# Patient Record
Sex: FEMALE | Race: BLACK OR AFRICAN AMERICAN | Employment: OTHER | URBAN - METROPOLITAN AREA
[De-identification: names, ages, dates, MRNs, and addresses within clinical notes are randomized per-mention and may not be internally consistent; named-entity substitution may affect disease eponyms.]

---

## 2017-05-31 ENCOUNTER — OFFICE VISIT (OUTPATIENT)
Dept: ORTHOPEDIC SURGERY | Facility: CLINIC | Age: 62
End: 2017-05-31

## 2017-05-31 VITALS
HEIGHT: 67 IN | WEIGHT: 200 LBS | HEART RATE: 73 BPM | SYSTOLIC BLOOD PRESSURE: 134 MMHG | BODY MASS INDEX: 31.39 KG/M2 | DIASTOLIC BLOOD PRESSURE: 78 MMHG | TEMPERATURE: 97.6 F

## 2017-05-31 DIAGNOSIS — M22.2X2 PATELLOFEMORAL PAIN SYNDROME OF LEFT KNEE: Primary | ICD-10-CM

## 2017-05-31 NOTE — PROGRESS NOTES
Patient: Antonio Goodman                MRN: [id number]       SSN: [id number]  YOB: 1955        AGE: 57 y.o. SEX: female  There is no height or weight on file to calculate BMI. PCP: Marycarmen Ramirez MD  05/31/17      Chief Complaint   Patient presents with    Knee Pain     LEFT       HISTORY OF PRESENT ILLNESS: Antonio Goodman is a 57 y.o. female who presents to the office for left knee pain. It was unassociated with any activity or event. It only occurs when she squats. She has no pain with steps, stairs or prolonged walking. She has had no injury to the knee and Voltaren gel at night eliminates her pain. Review of Systems   Constitutional: Negative. HENT: Negative. Eyes: Negative. Respiratory: Negative. Cardiovascular: Negative. Gastrointestinal: Negative. Genitourinary: Negative. Musculoskeletal: Positive for joint pain (left knee). Skin: Negative. Neurological: Negative. Endo/Heme/Allergies: Negative. Psychiatric/Behavioral: Negative. Social History     Social History    Marital status: UNKNOWN     Spouse name: N/A    Number of children: N/A    Years of education: N/A     Occupational History    Not on file. Social History Main Topics    Smoking status: Never Smoker    Smokeless tobacco: Not on file    Alcohol use No    Drug use: No    Sexual activity: Not on file     Other Topics Concern    Not on file     Social History Narrative        Past Medical History:   Diagnosis Date    Hyperlipidemia     Hypertension         No Known Allergies      Current Outpatient Prescriptions   Medication Sig    AMLODIPINE BESYLATE/BENAZEPRIL (LOTREL PO) Take 1 Tab by mouth daily. Patient takes 5-40 mg    hydrochlorothiazide (HYDRODIURIL) 25 mg tablet Take 25 mg by mouth daily.  simvastatin (ZOCOR) 20 mg tablet Take 20 mg by mouth daily.       ibuprofen (MOTRIN) 600 mg tablet Take 1 Tab by mouth every eight (8) hours as needed for Pain.    methocarbamol (ROBAXIN-750) 750 mg tablet Take 1 Tab by mouth three (3) times daily. No current facility-administered medications for this visit. Physical Exam  Constitutional: she is oriented to person, place, and time and well-developed, well-nourished, and in no distress. No distress. HENT:   Head: Normocephalic and atraumatic. Right Ear: Hearing normal.   Left Ear: Hearing normal.   Nose: Nose normal.   Eyes: Conjunctivae, EOM and lids are normal. Pupils are equal, round, and reactive to light. Neck: Trachea normal.   Pulmonary/Chest: Effort normal and breath sounds normal. No respiratory distress. Abdominal: Soft. Neurological: she is alert and oriented to person, place, and time. Skin: Skin is warm, dry and intact. she is not diaphoretic. Psychiatric: Affect normal.   Nursing note and vitals reviewed. Ortho Exam   Shows no swelling, effusion or increased warmth. Ligaments are stable and pain free when tested. Performance of a DKB shows no crepitus and no pain on today's exam.     RADIOGRAPHS:   XR's that accompanied the patient were normal in appearance. IMPRESSION & PLAN: I feel the pt's pain is patellar femoral in origin. I see no evidence of any internal derangement such as ligament tear or meniscal tear. I reccommended heat and/or ice, analgesic ointments and suggested she avoid squatting as much as possible as this increases the forces through the patellar femoral joint. I will see her back prn. Written by Jayjay Galvez, as dictated by Dr. Gerardo Amin, Dr. Darcy Gaucher, confirm that all documentation is accurate.

## 2018-03-06 ENCOUNTER — HOSPITAL ENCOUNTER (OUTPATIENT)
Dept: GENERAL RADIOLOGY | Age: 63
Discharge: HOME | End: 2018-03-06
Payer: COMMERCIAL

## 2018-03-06 DIAGNOSIS — G89.29 HEEL PAIN, CHRONIC, RIGHT: ICD-10-CM

## 2018-03-06 DIAGNOSIS — M79.671 HEEL PAIN, CHRONIC, RIGHT: ICD-10-CM

## 2018-03-06 PROCEDURE — 73650 X-RAY EXAM OF HEEL: CPT

## 2018-10-11 ENCOUNTER — HOSPITAL ENCOUNTER (OUTPATIENT)
Dept: MAMMOGRAPHY | Age: 63
Discharge: HOME | End: 2018-10-11
Attending: INTERNAL MEDICINE
Payer: COMMERCIAL

## 2018-10-11 DIAGNOSIS — Z12.31 VISIT FOR SCREENING MAMMOGRAM: ICD-10-CM

## 2018-10-11 PROCEDURE — 77063 BREAST TOMOSYNTHESIS BI: CPT

## 2018-10-16 ENCOUNTER — HOSPITAL ENCOUNTER (OUTPATIENT)
Dept: MAMMOGRAPHY | Age: 63
Discharge: HOME | End: 2018-10-16
Attending: INTERNAL MEDICINE
Payer: COMMERCIAL

## 2018-10-16 ENCOUNTER — HOSPITAL ENCOUNTER (OUTPATIENT)
Dept: ULTRASOUND IMAGING | Age: 63
Discharge: HOME | End: 2018-10-16
Attending: INTERNAL MEDICINE
Payer: COMMERCIAL

## 2018-10-16 DIAGNOSIS — R92.8 ABNORMAL MAMMOGRAM: ICD-10-CM

## 2018-10-16 PROCEDURE — 77061 BREAST TOMOSYNTHESIS UNI: CPT

## 2019-05-13 ENCOUNTER — HOSPITAL ENCOUNTER (OUTPATIENT)
Dept: ULTRASOUND IMAGING | Age: 64
Discharge: HOME | End: 2019-05-13
Attending: INTERNAL MEDICINE
Payer: COMMERCIAL

## 2019-05-13 ENCOUNTER — HOSPITAL ENCOUNTER (OUTPATIENT)
Dept: MAMMOGRAPHY | Age: 64
Discharge: HOME | End: 2019-05-13
Attending: INTERNAL MEDICINE
Payer: COMMERCIAL

## 2019-05-13 DIAGNOSIS — N64.89 DISTORTION OF CONTOUR OF BREAST: ICD-10-CM

## 2019-05-13 DIAGNOSIS — R92.8 ABNORMAL MAMMOGRAM: ICD-10-CM

## 2019-05-13 PROCEDURE — 77061 BREAST TOMOSYNTHESIS UNI: CPT

## 2019-11-14 ENCOUNTER — HOSPITAL ENCOUNTER (OUTPATIENT)
Dept: MAMMOGRAPHY | Age: 64
Discharge: HOME | End: 2019-11-14
Attending: INTERNAL MEDICINE
Payer: COMMERCIAL

## 2019-11-14 ENCOUNTER — HOSPITAL ENCOUNTER (OUTPATIENT)
Dept: ULTRASOUND IMAGING | Age: 64
Discharge: HOME | End: 2019-11-14
Attending: INTERNAL MEDICINE
Payer: COMMERCIAL

## 2019-11-14 DIAGNOSIS — R92.8 ABNORMAL MAMMOGRAM: ICD-10-CM

## 2019-11-14 DIAGNOSIS — N63.10 MASS OF BREAST, RIGHT: ICD-10-CM

## 2019-11-14 PROCEDURE — 77062 BREAST TOMOSYNTHESIS BI: CPT

## 2019-12-02 ENCOUNTER — HOSPITAL ENCOUNTER (OUTPATIENT)
Dept: LAB | Age: 64
Discharge: HOME | End: 2019-12-02
Payer: COMMERCIAL

## 2019-12-02 PROCEDURE — 36415 COLL VENOUS BLD VENIPUNCTURE: CPT

## 2019-12-02 PROCEDURE — 86038 ANTINUCLEAR ANTIBODIES: CPT

## 2019-12-02 PROCEDURE — 86225 DNA ANTIBODY NATIVE: CPT

## 2019-12-03 LAB
ANA SER QL: NEGATIVE
DSDNA AB SER-ACNC: 1 IU/ML (ref 0–9)

## 2019-12-11 ENCOUNTER — HOSPITAL ENCOUNTER (OUTPATIENT)
Dept: PHYSICAL THERAPY | Age: 64
Discharge: HOME | End: 2019-12-11
Payer: COMMERCIAL

## 2019-12-11 PROCEDURE — 97161 PT EVAL LOW COMPLEX 20 MIN: CPT

## 2019-12-11 PROCEDURE — 97110 THERAPEUTIC EXERCISES: CPT

## 2019-12-11 NOTE — PROGRESS NOTES
PT DAILY TREATMENT NOTE/CERVICAL [id number]    Patient Name: Nithin Jamil  Date:2019  : 1955  [x]  Patient  Verified  Payor: BLUE CROSS / Plan: [employer] [facility] / Product Type: PPO /    In time: 10:05  Out time: 10:50  Total Treatment Time (min): 45  Visit #: 1 of 8    Medicare/BCBS Only   Total Timed Codes (min):  8 1:1 Treatment Time:  45     Treatment Area: Radiculopathy, cervical region [M54.12]    SUBJECTIVE  Pain Level (0-10 scale): 5/10  []constant []intermittent []improving []worsening []no change since onset    Any medication changes, allergies to medications, adverse drug reactions, diagnosis change, or new procedure performed?: [x] No    [] Yes (see summary sheet for update)  Subjective functional status/changes:       Mechanism of Injury: pt. Reports her neck isn't too bad but is having a lot of numbness/burning in B legs. Reports also having numbness in anal area. Feels electrical impulses. Denies incontinence. Symptoms began in October with legs. Neck pain began in November. Denies numbness/tingling down arm. Numbness is constant. Pain is ok with rest. Pain worsens with activity. Was walking 4 miles a day but can't keep up now. Reports going to gym regularly. Worse symptoms on left. PMHx/Surgical Hx: lumbar laminectomy in   Work Hx: retired, . Difficulty sitting at piano  Living Situation: lives with oldest son  Pt Goals:   To return to normal    OBJECTIVE/EXAMINATION    8 min Therapeutic Exercise:  [] See flow sheet :   Rationale: increase ROM and increase strength to improve the patients ability to increase ease of ADLs          With   [x] TE   [] TA   [] neuro   [] other: Patient Education: [x] Review HEP    [] Progressed/Changed HEP based on:   [] positioning   [] body mechanics   [] transfers   [] heat/ice application    [] other:      Physical Therapy Evaluation Cervical Spine     Active Movements: [] N/A   [] Too acute   [] Other:  ROM % AROM % PROM Comments:pain, area   Forward flexion 80     Extension 30     SB right 25     SB left  25     Rotation right 60     Rotation left 60       Lumbar AROM flex;: 75% ext: 75% SB B: 75% rotation B: 75%  No change in symptoms with repeated flexion or extension       Neuro Screen (myotome/dematome/felexes): [] WNL  Myotome Level Muscle Test Myotome Level Muscle Test   C5 Shoulder Adduction - Deltoid  B: 4/5 C8 Finger Flexors B: 4+/5   C6 Wrist Extension  B: 4+/5 T1 Finger Abduction - Interossei  B: 4/5   C7 Elbow Extension  B: 4+/5     Comments:    Hip flex: B: 4/5 hip abd: B: 4-/5 hip ext: B: 4-/5 knee ext: B: 4+/5 knee flex: B: 4+/5 DF: B: 4+/5 PF B: 4+/5 great toe extension: B: 4+/5.      Special Tests:  Cervical:        Vertebral Artery:  [] R    [] L    [] +    [] -       Alar Ligament: [] R    [] L    [] +    [] -       Transverse Lig: [] R    [] L    [] +    [] -       Spurling's:  [x] R    [x] L    [] +    [x] -       Distraction:  [x] R    [x] L    [] +    [x] -       Compression: [x] R    [x] L    [] +    [x] -    Other tests/comments:  Negative SLR test  Negative prone instability test  B patella reflex: 1+ B achilles reflex: 0  Negative Babinski test       Pain Level (0-10 scale) post treatment:  5/10    ASSESSMENT/Changes in Function:      [x]  See Plan of Care  []  See progress note/recertification  []  See Discharge Summary         Progress towards goals / Updated goals:  See POC    PLAN  []  Upgrade activities as tolerated     [x]  Continue plan of care  []  Update interventions per flow sheet       []  Discharge due to:_  []  Other:_      Lucius Lanes, PT 12/11/2019  10:12 AM

## 2019-12-11 NOTE — PROGRESS NOTES
[facility]  [facility] [employer] [facility]  [address] [place] [address]  [phone] fax    Plan of Care/ Statement of Necessity for Physical Therapy Services    Patient name: Ashley Lew Start of Care: 2019   Referral source: Alex Sheehan MD : 1955    Medical Diagnosis: Radiculopathy, cervical region [M54.12]  Payor: BLUE CROSS / [facility] / Product Type: PPO /  Onset Date: 2019    Treatment Diagnosis:  Low back pain   Prior Hospitalization: see medical history Provider#: [id number]   Medications: Verified on Patient summary List    Comorbidities: HTN   Prior Level of Function: Ind with ambulation, Ind with ADLs      The Plan of Care and following information is based on the information from the initial evaluation. Assessment/ key information:  Pt. Is a 71year old female c/o B LE numbness and tingling that began with insidious onset in October. She does report numbness also in saddle region, but denies incontinence. Pain is worse with activity and decreases with rest. She presents with mild decrease in lumbar mobility with no change in radicular symptoms with repeated flexion or extension. No myotome involvement was noted but she has decreased B hip strength. Negative SLR test. She has decreased sensation to light touch in B LE but can still feel light touch throughout. Negative Babinski test. Skilled PT is medically necessary in order to improve LE strength and core stability for increased ease of ADLs and improved quality of life.      Evaluation Complexity History MEDIUM  Complexity : 1-2 comorbidities / personal factors will impact the outcome/ POC ; Examination MEDIUM Complexity : 3 Standardized tests and measures addressing body structure, function, activity limitation and / or participation in recreation  ;Presentation LOW Complexity : Stable, uncomplicated  ;Clinical Decision Making MEDIUM Complexity : FOTO score of 26-74  Overall Complexity Rating: LOW   Problem List: pain affecting function, decrease ROM, decrease strength, impaired gait/ balance, decrease ADL/ functional abilitiies, decrease activity tolerance, decrease flexibility/ joint mobility and decrease transfer abilities   Treatment Plan may include any combination of the following: Therapeutic exercise, Therapeutic activities, Neuromuscular re-education, Physical agent/modality, Gait/balance training, Manual therapy, Patient education, Self Care training, Functional mobility training and Home safety training  Patient / Family readiness to learn indicated by: asking questions  Persons(s) to be included in education: patient (P)  Barriers to Learning/Limitations: None  Patient Goal (s): to have less pain  Patient Self Reported Health Status: fair  Rehabilitation Potential: fair    Short Term Goals: To be accomplished in 1 weeks:  1. Patient will demonstrate compliance with HEP in order to improve LE strength     Long Term Goals: To be accomplished in 4 weeks:  1. Patient will improve FOTO score by 10 points in order to demonstrate a significant improvement in function. 2. Patient will improve B hip strength to 4/5 in order to increase ease of ADLs. 3. Patient will report a 50% improvement in symptoms since [facility] in order to improve quality of life. Frequency / Duration: Patient to be seen 2 times per week for 4 weeks. Patient/ Caregiver education and instruction: Diagnosis, prognosis, exercises   [x]  Plan of care has been reviewed with PTA    Certification Period: 12/11/19-1/9/20  Odette Garcia, JESSIKA 12/11/2019 6:03 PM    ________________________________________________________________________    I certify that the above Therapy Services are being furnished while the patient is under my care. I agree with the treatment plan and certify that this therapy is necessary.     Physician's Signature:____________Date:_________TIME:________    Carole Waller, Date and Time must be completed for valid certification **    Please sign and return to [facility]  [facility] [employer] [facility]  [address] [place] [address]  [phone] fax

## 2019-12-13 ENCOUNTER — HOSPITAL ENCOUNTER (OUTPATIENT)
Dept: PHYSICAL THERAPY | Age: 64
Discharge: HOME | End: 2019-12-13
Payer: COMMERCIAL

## 2019-12-13 PROCEDURE — 97110 THERAPEUTIC EXERCISES: CPT

## 2019-12-13 NOTE — PROGRESS NOTES
PT DAILY TREATMENT NOTE 10-18    Patient Name: Ramona Beltran  Date:2019  : 1955  [x]  Patient  Verified  Payor: BLUE CROSS / Plan: [facility] / Product Type: PPO /    In time:905  Out time:953  Total Treatment Time (min): 48  Visit #: 2 of 8    Medicare/BCBS Only   Total Timed Codes (min):  48 1:1 Treatment Time:  38       Treatment Area: Low back pain [M54.5]    SUBJECTIVE  Pain Level (0-10 scale): 5/10  Any medication changes, allergies to medications, adverse drug reactions, diagnosis change, or new procedure performed?: [x] No    [] Yes (see summary sheet for update)  Subjective functional status/changes:   [] No changes reported   LS laminectomy then 4 yrs ago sx came on and sx magnifies as the day goes on. Continues to have numbness and burning into legs with saddle numbness.      OBJECTIVE    Modality rationale: decrease pain to improve the patients ability to ease with   Min Type Additional Details    [] Estim:  []Unatt       []IFC  []Premod                        []Other:  []w/ice   []w/heat  Position:  Location:    [] Estim: []Att    []TENS instruct  []NMES                    []Other:  []w/US   []w/ice   []w/heat  Position:  Location:    []  Traction: [] Cervical       []Lumbar                       [] Prone          []Supine                       []Intermittent   []Continuous Lbs:  [] before manual  [] after manual    []  Ultrasound: []Continuous   [] Pulsed                           []1MHz   []3MHz W/cm2:  Location:    []  Iontophoresis with dexamethasone         Location: [] Take home patch   [] In clinic   10 []  Ice     [x]  heat  []  Ice massage  []  Laser   []  Anodyne Position:seated  Location: LS    []  Laser with stim  []  Other:  Position:  Location:    []  Vasopneumatic Device Pressure:       [] lo [] med [] hi   Temperature: [] lo [] med [] hi   [] Skin assessment post-treatment:  []intact []redness- no adverse reaction      38 min Therapeutic Exercise:  [] See flow sheet :   Rationale: increase ROM, increase strength and improve coordination to improve the patients ability to ease with ADL's. With   [] TE   [] TA   [] neuro   [] other: Patient Education: [x] Review HEP    [] Progressed/Changed HEP based on:   [] positioning   [] body mechanics   [] transfers   [] heat/ice application    [] other:      Other Objective/Functional Measures: Good tolerance to ex's. Pt did not report increased pain, but no decrease in pain either. She required cueing for all ex's      Pain Level (0-10 scale) post treatment: 4-5/10    ASSESSMENT/Changes in Function: Progressing slowly. No change in sx. Pt educated on TA holds during the day and during activities and ex's. She explained that she has the urge to have a bowel movement after a bowel movement. Patient will continue to benefit from skilled PT services to modify and progress therapeutic interventions, address functional mobility deficits, address ROM deficits, address strength deficits, analyze and address soft tissue restrictions, analyze and cue movement patterns, analyze and modify body mechanics/ergonomics and assess and modify postural abnormalities to attain remaining goals. [x]  See Plan of Care  []  See progress note/recertification  []  See Discharge Summary         Progress towards goals / Updated goals:  1. Patient will demonstrate compliance with HEP in order to improve LE strength    Progressing. 12/13/19  Long Term Goals: To be accomplished in 4 weeks:  1. Patient will improve FOTO score by 10 points in order to demonstrate a significant improvement in function. 2. Patient will improve B hip strength to 4/5 in order to increase ease of ADLs. 3. Patient will report a 50% improvement in symptoms since [facility] in order to improve quality of life.      PLAN  [x]  Upgrade activities as tolerated     [x]  Continue plan of care  []  Update interventions per flow sheet       []  Discharge due to:_  [] Other:_      Prisca Cartwright, PT 12/13/2019  9:09 AM    Future Appointments   Date Time Provider Lai García   12/17/2019  9:00 AM [facility]

## 2019-12-17 ENCOUNTER — HOSPITAL ENCOUNTER (OUTPATIENT)
Dept: PHYSICAL THERAPY | Age: 64
Discharge: HOME | End: 2019-12-17
Payer: COMMERCIAL

## 2019-12-17 PROCEDURE — 97110 THERAPEUTIC EXERCISES: CPT

## 2019-12-17 NOTE — PROGRESS NOTES
PT DAILY TREATMENT NOTE 10-18    Patient Name: Handy Magana  Date:2019  : 1955  [x]  Patient  Verified  Payor: BLUE CROSS / Plan: [employer] [facility] / Product Type: PPO /    In time:908  Out time:950  Total Treatment Time (min): 32  Visit #: 4 of 8    Medicare/BCBS Only   Total Timed Codes (min):  32 1:1 Treatment Time:  32       Treatment Area: Low back pain [M54.5]    SUBJECTIVE  Pain Level (0-10 scale): 5/10  Any medication changes, allergies to medications, adverse drug reactions, diagnosis change, or new procedure performed?: [x] No    [] Yes (see summary sheet for update)  Subjective functional status/changes:   [] No changes reported  Feels like rubber bands on her legs all the time. Pain stays at 5/10 constantly and depending on level of activity, she will have increased pain.     OBJECTIVE    Modality rationale: decrease edema and decrease pain to improve the patients ability to ease with ADL;s   Min Type Additional Details    [] Estim:  []Unatt       []IFC  []Premod                        []Other:  []w/ice   []w/heat  Position:  Location:    [] Estim: []Att    []TENS instruct  []NMES                    []Other:  []w/US   []w/ice   []w/heat  Position:  Location:    []  Traction: [] Cervical       []Lumbar                       [] Prone          []Supine                       []Intermittent   []Continuous Lbs:  [] before manual  [] after manual    []  Ultrasound: []Continuous   [] Pulsed                           []1MHz   []3MHz W/cm2:  Location:    []  Iontophoresis with dexamethasone         Location: [] Take home patch   [] In clinic   10 []  Ice     [x]  heat  []  Ice massage  []  Laser   []  Anodyne Position: seated  Location:LS    []  Laser with stim  []  Other:  Position:  Location:    []  Vasopneumatic Device Pressure:       [] lo [] med [] hi   Temperature: [] lo [] med [] hi   [] Skin assessment post-treatment:  []intact []redness- no adverse reaction    []redness  adverse reaction:       32 min Therapeutic Exercise:  [] See flow sheet :   Rationale: increase ROM and increase strength to improve the patients ability to ease with ADL's. With   [] TE   [] TA   [] neuro   [] other: Patient Education: [x] Review HEP    [] Progressed/Changed HEP based on:   [] positioning   [] body mechanics   [] transfers   [] heat/ice application    [] other:      Other Objective/Functional Measures: Good tolerance to ex's. No increased pain. Gradually adding /progressing ex's   Work on extension with left LE weakness ans challenged with extension lift. No change in radicular sx in prone laying or DAVID. Pain Level (0-10 scale) post treatment:    ASSESSMENT/Changes in Function: Feels like a weakness that is intermittent in to her legs that makes them want to give out. Burning in left LE >right LE in general.    Patient will continue to benefit from skilled PT services to modify and progress therapeutic interventions, address functional mobility deficits, address ROM deficits, address strength deficits, analyze and address soft tissue restrictions, analyze and cue movement patterns, analyze and modify body mechanics/ergonomics and assess and modify postural abnormalities to attain remaining goals. [x]  See Plan of Care  []  See progress note/recertification  []  See Discharge Summary         Progress towards goals / Updated goals:  1. Patient will demonstrate compliance with HEP in order to improve LE strength    Progressing. 12/13/19  Long Term Goals: To be accomplished in 4 weeks:  1. Patient will improve FOTO score by 10 points in order to demonstrate a significant improvement in function. 2. Patient will improve B hip strength to 4/5 in order to increase ease of ADLs.    3. Patient will report a 50% improvement in symptoms since [facility] in order to improve quality of life.     PLAN  [x]  Upgrade activities as tolerated     [x]  Continue plan of care  []  Update interventions per flow sheet       []  Discharge due to:_  []  Other:_      Bridget Spencer, PT 12/17/2019  9:22 AM    Future Appointments   Date Time Provider Lai García   12/19/2019 10:00 AM Sundanirudh Bird, PTA [facility]   12/23/2019  5:00 PM Yohan Miranda, PT [facility]   12/27/2019  5:30 PM Yohan Miranda, PT [id number]   1/2/2020  5:30 PM Yohan Miranda, PT [facility]   1/3/2020  9:00 AM Yohan Miranda, PT [facility]

## 2019-12-19 ENCOUNTER — HOSPITAL ENCOUNTER (OUTPATIENT)
Dept: PHYSICAL THERAPY | Age: 64
Discharge: HOME | End: 2019-12-19
Payer: COMMERCIAL

## 2019-12-19 PROCEDURE — 97110 THERAPEUTIC EXERCISES: CPT

## 2019-12-19 NOTE — PROGRESS NOTES
PT DAILY TREATMENT NOTE 10-18    Patient Name: Nick Sheikh  Date:2019  : 1955  [x]  Patient  Verified  Payor: BLUE CROSS / Plan: [facility] [place] / Product Type: PPO /    In time:1002  Out time:1042  Total Treatment Time (min): 40  Visit #: 4 of 8    Medicare/BCBS Only   Total Timed Codes (min):  30 1:1 Treatment Time:  30       Treatment Area: Low back pain [M54.5]    SUBJECTIVE  Pain Level (0-10 scale): 5  Any medication changes, allergies to medications, adverse drug reactions, diagnosis change, or new procedure performed?: [x] No    [] Yes (see summary sheet for update)  Subjective functional status/changes:   [] No changes reported  Patient reported constant numbness but slight pain in legs this morning     OBJECTIVE    Modality rationale: decrease pain and increase tissue extensibility to improve the patients ability to perform ADLs   Min Type Additional Details    [] Estim:  []Unatt       []IFC  []Premod                        []Other:  []w/ice   []w/heat  Position:  Location:    [] Estim: []Att    []TENS instruct  []NMES                    []Other:  []w/US   []w/ice   []w/heat  Position:  Location:    []  Traction: [] Cervical       []Lumbar                       [] Prone          []Supine                       []Intermittent   []Continuous Lbs:  [] before manual  [] after manual    []  Ultrasound: []Continuous   [] Pulsed                           []1MHz   []3MHz W/cm2:  Location:    []  Iontophoresis with dexamethasone         Location: [] Take home patch   [] In clinic   10 []  Ice     [x]  heat  []  Ice massage  []  Laser   []  Anodyne Position:seated  Location:back    []  Laser with stim  []  Other:  Position:  Location:    []  Vasopneumatic Device Pressure:       [] lo [] med [] hi   Temperature: [] lo [] med [] hi   [] Skin assessment post-treatment:  []intact []redness- no adverse reaction    []redness  adverse reaction:         30 min Therapeutic Exercise:  [x] See flow sheet :   Rationale: increase ROM and increase strength to improve the patients ability to perform ADLs                With   [] TE   [] TA   [] neuro   [] other: Patient Education: [x] Review HEP    [] Progressed/Changed HEP based on:   [] positioning   [] body mechanics   [] transfers   [] heat/ice application    [] other:      Other Objective/Functional Measures: progressed therex per flow sheet     Pain Level (0-10 scale) post treatment: 0    ASSESSMENT/Changes in Function: patient tolerated progression withoutl c/o increased pain. Patient will continue to benefit from skilled PT services to modify and progress therapeutic interventions, address functional mobility deficits, address ROM deficits, address strength deficits, analyze and address soft tissue restrictions and analyze and cue movement patterns to attain remaining goals. [x]  See Plan of Care  []  See progress note/recertification  []  See Discharge Summary         Progress towards goals / Updated goals:  1. Patient will demonstrate compliance with HEP in order to improve LE strength    Progressing. 12/13/19  Long Term Goals: To be accomplished in 4 weeks:  1. Patient will improve FOTO score by 10 points in order to demonstrate a significant improvement in function. 2. Patient will improve B hip strength to 4/5 in order to increase ease of ADLs.    3. Patient will report a 50% improvement in symptoms since [facility] in order to improve quality of life.        PLAN  []  Upgrade activities as tolerated     [x]  Continue plan of care  []  Update interventions per flow sheet       []  Discharge due to:_  []  Other:_      Kalyani Mann, PTA 12/19/2019  10:13 AM    Future Appointments   Date Time Provider Lai García   12/23/2019  5:00 PM Kalyn Northern, PT [facility]   12/27/2019  5:30 PM [facility], PT [facility]   1/2/2020  5:30 PM Kalyn Northern PT [facility]   1/3/2020  9:00 AM Kalyn Northern, PT [facility]

## 2019-12-23 ENCOUNTER — HOSPITAL ENCOUNTER (OUTPATIENT)
Dept: PHYSICAL THERAPY | Age: 64
Discharge: HOME | End: 2019-12-23
Payer: COMMERCIAL

## 2019-12-23 PROCEDURE — 97110 THERAPEUTIC EXERCISES: CPT

## 2019-12-23 NOTE — PROGRESS NOTES
PT DAILY TREATMENT NOTE 10-18    Patient Name: Handy Magana  Date:2019  : 1955  [x]  Patient  Verified  Payor: BLUE CROSS / Plan: [employer] [facility] / Product Type: PPO /    In time: 5:00  Out time: 5:44  Total Treatment Time (min): 44  Visit #: 5 of 8    Medicare/BCBS Only   Total Timed Codes (min):  34 1:1 Treatment Time:  34       Treatment Area: Low back pain [M54.5]    SUBJECTIVE  Pain Level (0-10 scale): 5/10  Any medication changes, allergies to medications, adverse drug reactions, diagnosis change, or new procedure performed?: [x] No    [] Yes (see summary sheet for update)  Subjective functional status/changes:   [] No changes reported  Pt. Reports she is feeling about the bear today.      OBJECTIVE    Modality rationale: decrease pain and increase tissue extensibility to improve the patients ability to increase ease of ADls   Min Type Additional Details    [] Estim:  []Unatt       []IFC  []Premod                        []Other:  []w/ice   []w/heat  Position:  Location:    [] Estim: []Att    []TENS instruct  []NMES                    []Other:  []w/US   []w/ice   []w/heat  Position:  Location:    []  Traction: [] Cervical       []Lumbar                       [] Prone          []Supine                       []Intermittent   []Continuous Lbs:  [] before manual  [] after manual    []  Ultrasound: []Continuous   [] Pulsed                           []1MHz   []3MHz W/cm2:  Location:    []  Iontophoresis with dexamethasone         Location: [] Take home patch   [] In clinic   10 []  Ice     [x]  heat  []  Ice massage  []  Laser   []  Anodyne Position: seated  Location: low back    []  Laser with stim  []  Other:  Position:  Location:    []  Vasopneumatic Device Pressure:       [] lo [] med [] hi   Temperature: [] lo [] med [] hi   [x] Skin assessment post-treatment:  [x]intact []redness- no adverse reaction    []redness  adverse reaction:     34 min Therapeutic Exercise:  [x] See flow sheet :   Rationale: increase ROM and increase strength to improve the patients ability to increase ease of ADLs          With   [x] TE   [] TA   [] neuro   [] other: Patient Education: [x] Review HEP    [] Progressed/Changed HEP based on:   [] positioning   [] body mechanics   [] transfers   [] heat/ice application    [] other:      Other Objective/Functional Measures:   % improvement in symptoms: 0%  Prone extension increased radicular symptoms so these were D/C  No change in symptoms with SB DKTC  Pt. Was educated on table slides for HEP    Pain Level (0-10 scale) post treatment:  5/10    ASSESSMENT/Changes in Function:  Pt. Is making limited progress towards goals. She continues to have significant B radicular symptoms that have not changed with PT. Symptoms appear to worsen with extension exercises but no movements have centralized her symptoms so far. Patient will continue to benefit from skilled PT services to modify and progress therapeutic interventions, address functional mobility deficits, address ROM deficits, address strength deficits, analyze and address soft tissue restrictions, analyze and cue movement patterns, analyze and modify body mechanics/ergonomics and assess and modify postural abnormalities to attain remaining goals. Progress towards goals / Updated goals:  1. Patient will demonstrate compliance with HEP in order to improve LE strength    Progressing. 12/13/19  Long Term Goals: To be accomplished in 4 weeks:  1. Patient will improve FOTO score by 10 points in order to demonstrate a significant improvement in function. 2. Patient will improve B hip strength to 4/5 in order to increase ease of ADLs.    3. Patient will report a 50% improvement in symptoms since [facility] in order to improve quality of life.   Not met: 0% improvement (12/23/19)    PLAN  []  Upgrade activities as tolerated     [x]  Continue plan of care  []  Update interventions per flow sheet       []  Discharge due to:_  [] Other:_      Stef Durham, PT 12/23/2019  5:02 PM    Future Appointments   Date Time Provider Lai Alanizi   12/27/2019  5:30 PM Deena Love, PT [facility]   1/2/2020  5:30 PM Sharan Benavidez PTA [facility]   1/3/2020  9:00 AM Deena Love, PT [facility]

## 2019-12-27 ENCOUNTER — HOSPITAL ENCOUNTER (OUTPATIENT)
Dept: PHYSICAL THERAPY | Age: 64
Discharge: HOME | End: 2019-12-27
Payer: COMMERCIAL

## 2019-12-27 PROCEDURE — 97110 THERAPEUTIC EXERCISES: CPT

## 2019-12-27 PROCEDURE — 97012 MECHANICAL TRACTION THERAPY: CPT

## 2019-12-27 NOTE — PROGRESS NOTES
PT DAILY TREATMENT NOTE 10-18    Patient Name: Jai Hickey  Date:2019  : 1955  [x]  Patient  Verified  Payor: BLUE CROSS / Plan: [employer] [facility] / Product Type: PPO /    In time: 5:26  Out time: 6:10  Total Treatment Time (min): 44  Visit #: 6 of 8    Medicare/BCBS Only   Total Timed Codes (min):  34 1:1 Treatment Time:  34       Treatment Area: Low back pain [M54.5]    SUBJECTIVE  Pain Level (0-10 scale):  5/10  Any medication changes, allergies to medications, adverse drug reactions, diagnosis change, or new procedure performed?: [x] No    [] Yes (see summary sheet for update)  Subjective functional status/changes:   [] No changes reported  Pt. Reports she is still feeling about the same.      OBJECTIVE    Modality rationale: decrease pain and increase tissue extensibility to improve the patients ability to increase ease of ADLs   Min Type Additional Details    [] Estim:  []Unatt       []IFC  []Premod                        []Other:  []w/ice   []w/heat  Position:  Location:    [] Estim: []Att    []TENS instruct  []NMES                    []Other:  []w/US   []w/ice   []w/heat  Position:  Location:   10 [x]  Traction: [] Cervical       [x]Lumbar                       [] Prone          [x]Supine                       [x]Intermittent   []Continuous Lbs: 75/50#  [] before manual  [] after manual    []  Ultrasound: []Continuous   [] Pulsed                           []1MHz   []3MHz W/cm2:  Location:    []  Iontophoresis with dexamethasone         Location: [] Take home patch   [] In clinic    []  Ice     []  heat  []  Ice massage  []  Laser   []  Anodyne Position:  Location:    []  Laser with stim  []  Other:  Position:  Location:    []  Vasopneumatic Device Pressure:       [] lo [] med [] hi   Temperature: [] lo [] med [] hi   [x] Skin assessment post-treatment:  [x]intact []redness- no adverse reaction    []redness  adverse reaction:     34 min Therapeutic Exercise:  [x] See flow sheet : Rationale: increase ROM and increase strength to improve the patients ability to increase ease of ADLs          With   [] TE   [] TA   [] neuro   [] other: Patient Education: [x] Review HEP    [] Progressed/Changed HEP based on:   [] positioning   [] body mechanics   [] transfers   [] heat/ice application    [] other:      Other Objective/Functional Measures:   No change in radicular symptoms with therex   B hip abduction MMT: 4-/5  No change with traction or therex today  Discussed lack of progress with pt. And she is agreeable to D/C at this time and to follow back up with physician     Pain Level (0-10 scale) post treatment: 5/10    ASSESSMENT/Changes in Function:      See D/C note     Progress towards goals / Updated goals:  See d/C note    PLAN  []  Upgrade activities as tolerated     []  Continue plan of care  []  Update interventions per flow sheet       [x]  Discharge due to:_ lack of progress towards goals.    []  Other:_      Cher Khan PT 12/27/2019  9:10 AM    Future Appointments   Date Time Provider Lai García   12/27/2019  5:30 PM Ethan Cabrera, PT [facility]   1/2/2020  5:30 PM Amy George PTA [facility]   1/3/2020  9:00 AM Ethan Cabrera PT [facility]

## 2019-12-27 NOTE — PROGRESS NOTES
[facility] [address]  [phone] fax    Physical Therapy Discharge Summary    Patient name: Adrienne Reed Start of Care:  19   Referral source: Reyes Mckeon MD : 1955   Medical/Treatment Diagnosis: Low back pain [M54.5]  Payor: Any Mcnally / [facility] [address] [facility] [address] / Product Type: PPO /  Onset Date: 2019     Prior Hospitalization: see medical history Provider#: [id number]   Medications: Verified on Patient Summary List    Comorbidities: HTN   Prior Level of Function: Ind with ambulation, Ind with ADLs    Visits from Start of Care:  6    Missed Visits: 0    Reporting Period : 19 to 19    Summary of Care:  Goal: Patient will improve FOTO score by 10 points in order to demonstrate a significant improvement in function. Status at last note/certification: n/a  Status at discharge: not met    Goal: Patient will improve B hip strength to 4/5 in order to increase ease of ADLs. Status at last note/certification: hip abd: B: 4-/5  Status at discharge: not met    Goal: Patient will report a 50% improvement in symptoms since [facility] in order to improve quality of life. Status at last note/certification: n/a  Status at discharge: not met    Pt. Was seen for 6 visits and did not make any progress so will be D/C at this time. No change in radicular symptoms with repeated flexion exercises or with traction. She has increased radicular symptoms with extension activities. She continues to have decreased B hip strength at 4-/5. Pt. Continues to have numbness in saddle region that remains unchanged. She was educated on following back up with physician secondary to lack of progress with PT and to continue with her HEP.      ASSESSMENT/RECOMMENDATIONS:  [x]Discontinue therapy: []Patient has reached or is progressing toward set goals      []Patient is non-compliant or has abdicated      [x]Due to lack of appreciable progress towards set goals    Mary Kate Connolly, JESSIKA 12/27/2019 6:53 PM

## 2020-01-02 ENCOUNTER — APPOINTMENT (OUTPATIENT)
Dept: PHYSICAL THERAPY | Age: 65
End: 2020-01-02

## 2020-01-03 ENCOUNTER — APPOINTMENT (OUTPATIENT)
Dept: PHYSICAL THERAPY | Age: 65
End: 2020-01-03

## 2020-11-16 ENCOUNTER — HOSPITAL ENCOUNTER (OUTPATIENT)
Dept: BONE DENSITY | Age: 65
Discharge: HOME | End: 2020-11-16
Attending: OBSTETRICS & GYNECOLOGY
Payer: COMMERCIAL

## 2020-11-16 ENCOUNTER — HOSPITAL ENCOUNTER (OUTPATIENT)
Dept: MAMMOGRAPHY | Age: 65
Discharge: HOME | End: 2020-11-16
Attending: INTERNAL MEDICINE
Payer: COMMERCIAL

## 2020-11-16 DIAGNOSIS — Z13.820 SPECIAL SCREENING FOR OSTEOPOROSIS: ICD-10-CM

## 2020-11-16 DIAGNOSIS — Z12.31 VISIT FOR SCREENING MAMMOGRAM: ICD-10-CM

## 2020-11-16 PROCEDURE — 77080 DXA BONE DENSITY AXIAL: CPT

## 2020-11-16 PROCEDURE — 77063 BREAST TOMOSYNTHESIS BI: CPT

## 2021-04-08 ENCOUNTER — OFFICE VISIT (OUTPATIENT)
Dept: ORTHOPEDIC SURGERY | Age: 66
End: 2021-04-08
Payer: COMMERCIAL

## 2021-04-08 VITALS
WEIGHT: 217 LBS | OXYGEN SATURATION: 98 % | RESPIRATION RATE: 16 BRPM | HEART RATE: 78 BPM | HEIGHT: 67 IN | BODY MASS INDEX: 34.06 KG/M2 | TEMPERATURE: 97.3 F

## 2021-04-08 DIAGNOSIS — M22.42 CHONDROMALACIA OF BOTH PATELLAE: Primary | ICD-10-CM

## 2021-04-08 DIAGNOSIS — M22.41 CHONDROMALACIA OF BOTH PATELLAE: Primary | ICD-10-CM

## 2021-04-08 PROCEDURE — 99203 OFFICE O/P NEW LOW 30 MIN: CPT | Performed by: ORTHOPAEDIC SURGERY

## 2021-04-08 RX ORDER — OLMESARTAN MEDOXOMIL, AMLODIPINE AND HYDROCHLOROTHIAZIDE TABLET 40/10/25 MG 40; 10; 25 MG/1; MG/1; MG/1
TABLET ORAL
COMMUNITY
Start: 2021-01-28

## 2021-04-08 RX ORDER — SPIRONOLACTONE 25 MG/1
TABLET ORAL
COMMUNITY
Start: 2021-03-30

## 2021-04-08 NOTE — PATIENT INSTRUCTIONS
Patellofemoral Pain Syndrome (Runner's Knee): Exercises Introduction Here are some examples of exercises for you to try. The exercises may be suggested for a condition or for rehabilitation. Start each exercise slowly. Ease off the exercises if you start to have pain. You will be told when to start these exercises and which ones will work best for you. How to do the exercises Calf wall stretch 1. Stand facing a wall with your hands on the wall at about eye level. Put your affected leg about a step behind your other leg. 2. Keeping your back leg straight and your back heel on the floor, bend your front knee and gently bring your hip and chest toward the wall until you feel a stretch in the calf of your back leg. 3. Hold the stretch for at least 15 to 30 seconds. 4. Repeat 2 to 4 times. 5. Repeat steps 1 through 4, but this time keep your back knee bent. Quadriceps stretch 1. If you are not steady on your feet, hold on to a chair, counter, or wall. 2. Bend your affected leg, and reach behind you to grab the front of your foot or ankle with the hand on the same side. For example, if you are stretching your right leg, use your right hand. 3. Keeping your knees next to each other, pull your foot toward your buttock until you feel a gentle stretch across the front of your hip and down the front of your thigh. Your knee should be pointed directly to the ground, and not out to the side. 4. Hold the stretch for at least 15 to 30 seconds. 5. Repeat 2 to 4 times. Hamstring wall stretch 1. Lie on your back in a doorway, with your good leg through the open door. 2. Slide your affected leg up the wall to straighten your knee. You should feel a gentle stretch down the back of your leg. 3. Hold the stretch for at least 1 minute. Then over time, try to lengthen the time you hold the stretch to as long as 6 minutes. 4. Repeat 2 to 4 times.  
5. If you do not have a place to do this exercise in a doorway, there is another way to do it: 6. Lie on your back, and bend your affected leg. 7. Loop a towel under the ball and toes of that foot, and hold the ends of the towel in your hands. 8. Straighten your knee, and slowly pull back on the towel. You should feel a gentle stretch down the back of your leg. 9. Hold the stretch for at least 15 to 30 seconds. Or even better, hold the stretch for 1 minute if you can. 10. Repeat 2 to 4 times. 1. Do not arch your back. 2. Do not bend either knee. 3. Keep one heel touching the floor and the other heel touching the wall. Do not point your toes. [employer] 1. Sit with your affected leg straight and supported on the floor or a firm bed. Place a small, rolled-up towel under your affected knee. Your other leg should be bent, with that foot flat on the floor. 2. Tighten the thigh muscles of your affected leg by pressing the back of your knee down into the towel. 3. Hold for about 6 seconds, then rest for up to 10 seconds. 4. Repeat 8 to 12 times. Straight-leg raises to the front 1. Lie on your back with your good knee bent so that your foot rests flat on the floor. Your affected leg should be straight. Make sure that your low back has a normal curve. You should be able to slip your hand in between the floor and the small of your back, with your palm touching the floor and your back touching the back of your hand. 2. Tighten the thigh muscles in your affected leg by pressing the back of your knee flat down to the floor. Hold your knee straight. 3. Keeping the thigh muscles tight and your leg straight, lift your affected leg up so that your heel is about 12 inches off the floor. 4. Hold for about 6 seconds, then lower your leg slowly. Rest for up to 10 seconds between repetitions. 5. Repeat 8 to 12 times. Straight-leg raises to the back 1. Lie on your stomach, and lift your leg straight up behind you (toward the ceiling).  
2. Lift your toes about 6 inches off the floor, hold for about 6 seconds, then lower slowly. 3. Do 8 to 12 repetitions. Wall slide with ball squeeze 1. Stand with your back against a wall and with your feet about shoulder-width apart. Your feet should be about 12 inches away from the wall. 2. Put a ball about the size of a soccer ball between your knees. Then slowly slide down the wall until your knees are bent about 20 to 30 degrees. 3. Tighten your thigh muscles by squeezing the ball between your knees. Hold that position for about 10 seconds, then stop squeezing. Rest for up to 10 seconds between repetitions. 4. Repeat 8 to 12 times. Follow-up care is a key part of your treatment and safety. Be sure to make and go to all appointments, and call your doctor if you are having problems. It's also a good idea to know your test results and keep a list of the medicines you take. Where can you learn more? Go to http://www.salazar.com/ Enter A404 in the search box to learn more about \"Patellofemoral Pain Syndrome (Runner's Knee): Exercises. \" Current as of: November 16, 2020               Content Version: 12.8 © 7729-5088 [employer]. Care instructions adapted under license by [employer] (which disclaims liability or warranty for this information). If you have questions about a medical condition or this instruction, always ask your healthcare professional. [place] [address] any warranty or liability for your use of this information.

## 2021-04-08 NOTE — PROGRESS NOTES
AMBULATORY PROGRESS NOTE      Patient: Erica Paz             MRN: [id number]     SSN: [id number] Body mass index is 34.5 kg/m². YOB: 1955     AGE: 76 y.o. EX: female    PCP: Yana Mcleod MD       IMPRESSION //  DIAGNOSIS AND TREATMENT PLAN        Erica Paz has bilateral cartilage patella, left worse than right knee, this current time. Recommend continue conservative care, for her. Home excise program was recommended for her. DIAGNOSES  1. Chondromalacia of both patellae        Orders Placed This Encounter    spironolactone (ALDACTONE) 25 mg tablet     Sig: TAKE 1 TABLET BY MOUTH ONCE DAILY    Olmesartan-amLODIPine-HCTZ 40-10-25 mg tab     Sig: olmesartan 40 mg-amlodipine 10 mg-hydrochlorothiazide 25 mg tablet        PLAN:    1. HEP Isosymmetric exercises demonstrated for her    RTO-  PRN    Erica Paz  expresses understanding of the diagnosis, treatment plan, and all of their proposed questions were answered to their satisfaction. Patient education has been provided re the diagnoses. Erica Paz may have a reminder for a \"due or due soon\" health maintenance. I have asked that she contact her primary care provider for follow-up on this health maintenance. HPI //  Evan Vaughan IS A 76 y.o. female who is a/an  established patient, presenting to my outpatient office for evaluation of  the following chief complaint(s):     Chief Complaint   Patient presents with    Knee Pain     bilateral       Erica Paz has seen Dr. Ramón Corrales in the past, for bilateral knee OA. She had x-rays done at [facility], on March 18, 2021. X-rays of the left knee, were reviewed 3 views of the left knee. These x-rays show no evidence of fractures of his dislocation. There is some OA, tricompartment OA of her left knee also seen at the patellofemoral joint articulation.     2 view, right knee was also obtained same date same location, these showed small effusion, no fractures or visualization. There is early tricompartmental knee OA as is joint space narrowing the medial lateral compartments, peripheral osteophytosis about the medial lateral tibial plateau regions, as well as some spurring to the superior margin of the patella as seen the lateral radiographic x-ray, of this right knee. Chief complaint, started pain. She is able to walk about 5 miles a day every day and not have any pain or skin when she is walking. Her discomfort comes when she starts up. She gets up from a low seat, chair toilet seat, or automobile a low seat, which initiates weightbearing she has stiffness and pain in each knee. She able to walk off the pain after several minutes. She cannot squat cannot deep bend. She does have trouble when she goes up and down steps, in terms of having anterior left and right knee pain. She is currently being followed by Dr. Cira Hansen, she has a diagnosis of idiopathic neuropathy in her feet however also has a history of vertigo in the past.  At 1 point in the past she was taking gabapentin and metoprolol, she experienced vertigo, and she stopped each of these medications, not knowing fully aware of which medication was causing her vertigo. But her vertigo never returned and she never restarted her her gabapentin, and or her metoprolol. She is used topical anti-inflammatory agents, for each knee, and is giving her and afforded her mild relief of her knee pain. Her chief complaint again, start up knee pain. Visit Vitals  Pulse 78   Temp 97.3 °F (36.3 °C)   Resp 16   Ht 5' 6.5\" (1.689 m)   Wt 217 lb (98.4 kg)   SpO2 98%   BMI 34.50 kg/m²       Appearance: Alert, well appearing and pleasant patient who is in no distress, oriented to person, place/time, and who follows commands. This patient is accompanied in the examination room by her  self.  There is signs of: no dementia  Psychiatric: Affect/mood are appropriate. Speech normal in context and clarity, memory intact grossly, no involuntary movements - tremors. Patient arrives to office via: without assistive device:    H EENT (2): Head normocephalic & atraumatic. Both pupils are round     Eye: EOM are intact // Neck: ROM WNL  //  Hearings Intact   Respiratory: Breathing non labored      Knees:  Bilateral       Cutaneous: Skin intact, no abrasions, blisters, wounds, erythema       Effusion: Is not present       Crepitus:  mild PF joint crepitus       Tenderness: Tenderness: Patellar tendon        Alignment of Knee: neutral when standing       ROM: diminished range of motion       Fullness or swelling: None to popliteal fossa region,         Stability: No instability to anterior, posterior, varus, valgus stress testing       Thrust:   No varus thrust with gait       Neuro: knee flexion, knee extension, plantar flexion and plantar dorsiflexion, Extensor mechanism is intact        CHART REVIEW     Griselda Vyas has been experiencing pain and discomfort confirmed as outlined in the pain assessment outlined below.  was reviewed by Darnell Edwards MD on 4/8/2021. Pain Assessment  4/8/2021   Location of Pain Knee   Location Modifiers Right;Left   Severity of Pain 2   Quality of Pain Other (Comment); Aching   Quality of Pain Comment soreness   Duration of Pain Persistent   Frequency of Pain Constant   Date Pain First Started (No Data)   Date Pain First Started Comment month ago   Aggravating Factors Standing;Stairs   Limiting Behavior No   Relieving Factors NSAID   Result of Injury Olamide Vyas  has a past medical history of Hyperlipidemia and Hypertension.      Patients is employed at:         Past Medical History:   Diagnosis Date    Hyperlipidemia     Hypertension      Past Surgical History:   Procedure Laterality Date    HX LUMBAR LAMINECTOMY  1996    HX OTHER SURGICAL  1968    nasal polyps    HX OTHER SURGICAL      colonoscopy     Current Outpatient Medications   Medication Sig    spironolactone (ALDACTONE) 25 mg tablet TAKE 1 TABLET BY MOUTH ONCE DAILY    Olmesartan-amLODIPine-HCTZ 40-10-25 mg tab olmesartan 40 mg-amlodipine 10 mg-hydrochlorothiazide 25 mg tablet    ibuprofen (MOTRIN) 600 mg tablet Take 1 Tab by mouth every eight (8) hours as needed for Pain.  methocarbamol (ROBAXIN-750) 750 mg tablet Take 1 Tab by mouth three (3) times daily.  AMLODIPINE BESYLATE/BENAZEPRIL (LOTREL PO) Take 1 Tab by mouth daily. Patient takes 5-40 mg    hydrochlorothiazide (HYDRODIURIL) 25 mg tablet Take 25 mg by mouth daily.  simvastatin (ZOCOR) 20 mg tablet Take 20 mg by mouth daily. No current facility-administered medications for this visit. No Known Allergies  Social History     Occupational History    Not on file   Tobacco Use    Smoking status: Never Smoker    Smokeless tobacco: Never Used   Substance and Sexual Activity    Alcohol use: No    Drug use: No    Sexual activity: Not on file     Family History   Problem Relation Age of Onset    Breast Cancer Other 45    Cancer Maternal Grandmother         DIAGNOSTIC LAB DATA      No results found for: HBA1C, HGBE8, TKW5QBJJ //   Lab Results   Component Value Date/Time    Glucose, POC 92 07/17/2015 10:28 AM        No results found for: ZHD5VJIZ, EHA8ZMJQ      No results found for: VITD3, XQVID2, XQVID3, XQVID, VD3RIA, QTYL04THTZH      REVIEW OF SYSTEMS : 4/8/2021  ALL BELOW ARE Negative except : SEE HPI     All other systems reviewed and are negative. 12 point review of systems otherwise negative unless noted in HPI. DIAGNOSTIC IMAGING /ORDERS       [facility] IMAGING : INTERPRETATION BY RADIOLOGIST         2021 March  KNEE 3 VIEWS RT3/19/2021  [facility]  Result Impression       1. No evidence for fracture or dislocation. 2. Moderate tricompartment degenerative changes. 3. Joint effusion.     Signed By: Graham Cade Dianne Aparicio MD on 3/19/2021 1:50 PM   Result Narrative   EXAM: RIGHT KNEE RADIOGRAPHS    CLINICAL INDICATION/HISTORY: Provided with order -   M25.561: Acute pain of both knees  M25.562: Acute pain of both knees    > Additional: None    COMPARISON: None    TECHNIQUE: 3 views of the right knee    _______________    FINDINGS:    BONES: No evidence for fracture or dislocation. Tricompartment degenerative changes. Minimal calcifications of the medial and lateral knee compartments. Osteophytes are noted along the medial and lateral tibial plateau. Spurring along the superior margin of the patella. SOFT TISSUES: Joint effusion. _______________   Other Result Information   Interface, [employer] Rad Res - 03/19/2021  1:52 PM EDT  Formatting of this note might be different from the original.  EXAM: RIGHT KNEE RADIOGRAPHS    CLINICAL INDICATION/HISTORY: Provided with order -   M25.561: Acute pain of both knees  M25.562: Acute pain of both knees    > Additional: None    COMPARISON: None    TECHNIQUE: 3 views of the right knee    _______________    FINDINGS:    BONES: No evidence for fracture or dislocation. Tricompartment degenerative changes. Minimal calcifications of the medial and lateral knee compartments. Osteophytes are noted along the medial and lateral tibial plateau. Spurring along the superior margin of the patella. SOFT TISSUES: Joint effusion. _______________    IMPRESSION      1. No evidence for fracture or dislocation. 2. Moderate tricompartment degenerative changes. 3. Joint effusion. Signed By: Rhett Porras MD on 3/19/2021 1:50 PM   Status Results Details     Encounter Summary   KNEE 3 VIEWS LT3/19/2021  [facility]  Result Impression       1. No evidence for fracture or dislocation. 2. Moderate degenerative changes. 3. Chondrocalcinosis.     Signed By: Rhett Porras MD on 3/19/2021 1:48 PM   Result Narrative   EXAM: LEFT KNEE RADIOGRAPHS    CLINICAL INDICATION/HISTORY: Provided with order -   M25.569: Knee pain    > Additional: None    COMPARISON: 5/17/2017. TECHNIQUE: 3 views of the left knee    _______________    FINDINGS:    BONES: No evidence for fracture or dislocation. Tricompartment degenerative changes. Osteophytes along the tibial plateau and patella. Medial and lateral knee compartment calcifications. SOFT TISSUES: Unremarkable.    _______________   Other Result Information   Interface, [employer] Rad Res - 03/19/2021  1:50 PM EDT  Formatting of this note might be different from the original.  EXAM: LEFT KNEE RADIOGRAPHS    CLINICAL INDICATION/HISTORY: Provided with order -   M25.569: Knee pain    > Additional: None    COMPARISON: 5/17/2017. TECHNIQUE: 3 views of the left knee    _______________    FINDINGS:    BONES: No evidence for fracture or dislocation. Tricompartment degenerative changes. Osteophytes along the tibial plateau and patella. Medial and lateral knee compartment calcifications. SOFT TISSUES: Unremarkable.    _______________    IMPRESSION      1. No evidence for fracture or dislocation. 2. Moderate degenerative changes. 3. Chondrocalcinosis. Signed By: Thad Dill MD on 3/19/2021 1:48 PM   Status Results Details     Encounter Summary       I have reviewed the results/images of the above study. On this date 04/08/2021 I have spent 35 minutes reviewing previous notes, test results and face to face with the patient discussing the diagnosis and importance of compliance with the treatment plan as well as documenting on the day of the visit. An electronic signature was used to authenticate this note. Clara Rosales MD  4/8/2021  4:00 PM      Disclaimer: Sections of this note are dictated using utilizing voice recognition software, which may have resulted in some phonetic based errors in grammar and contents.  Even though attempts were made to correct all the mistakes, some may have been missed, and remained in the body of the document. If questions arise, please contact our department.      Haydee Johnson MD  4/8/2021  4:00 PM

## 2021-11-29 ENCOUNTER — TRANSCRIBE ORDER (OUTPATIENT)
Dept: SCHEDULING | Age: 66
End: 2021-11-29

## 2021-11-29 DIAGNOSIS — Z12.31 VISIT FOR SCREENING MAMMOGRAM: Primary | ICD-10-CM

## 2021-12-02 ENCOUNTER — HOSPITAL ENCOUNTER (OUTPATIENT)
Dept: MAMMOGRAPHY | Age: 66
Discharge: HOME | End: 2021-12-02
Attending: FAMILY MEDICINE
Payer: COMMERCIAL

## 2021-12-02 DIAGNOSIS — Z12.31 VISIT FOR SCREENING MAMMOGRAM: ICD-10-CM

## 2021-12-02 PROCEDURE — 77067 SCR MAMMO BI INCL CAD: CPT

## 2022-11-03 ENCOUNTER — TRANSCRIBE ORDER (OUTPATIENT)
Dept: SCHEDULING | Age: 67
End: 2022-11-03

## 2022-11-03 DIAGNOSIS — Z12.31 VISIT FOR SCREENING MAMMOGRAM: Primary | ICD-10-CM

## 2022-12-05 ENCOUNTER — HOSPITAL ENCOUNTER (OUTPATIENT)
Dept: MAMMOGRAPHY | Age: 67
Discharge: HOME | End: 2022-12-05
Attending: INTERNAL MEDICINE
Payer: COMMERCIAL

## 2022-12-05 DIAGNOSIS — Z12.31 VISIT FOR SCREENING MAMMOGRAM: ICD-10-CM

## 2022-12-05 PROCEDURE — 77063 BREAST TOMOSYNTHESIS BI: CPT

## 2023-02-14 ENCOUNTER — HOSPITAL ENCOUNTER (OUTPATIENT)
Facility: HOSPITAL | Age: 68
Setting detail: RECURRING SERIES
Discharge: HOME | End: 2023-02-17
Payer: COMMERCIAL

## 2023-02-14 PROCEDURE — 97110 THERAPEUTIC EXERCISES: CPT

## 2023-02-14 PROCEDURE — 97140 MANUAL THERAPY 1/> REGIONS: CPT

## 2023-02-14 PROCEDURE — 97161 PT EVAL LOW COMPLEX 20 MIN: CPT

## 2023-02-14 NOTE — THERAPY EVALUATION
[facility] PHYSICAL THERAPY  [address] [facility] JJ:[phone] Fx: [phone]  Plan of Care / Statement of Necessity for Physical Therapy Services     Patient Name: Jordan Jeff : 1955   Medical   Diagnosis: Right Ankle Pain Treatment Diagnosis: No admission diagnoses are documented for this encounter. Onset Date: >1 yr/ worst recently     Referral Source: Sari Whitehead Start of Care [facility]): 2023   Prior Hospitalization: See medical history Provider #: [id number]   Prior Level of Function: Independent Community Ambulator. Likes singing and working out. Comorbidities: HBP   Medications: Verified on Patient Summary List     Assessment / key information:  Pt is a 75 yr old female with cc of posterior heel pain due to bone spur on right foot. Pt reports onset >1yr and has recently been getting worst. Pain today is 5/10 and is described as throbbing. She also reports her right hip has been hurting due to current ongoing Bursitis. Pt ambulates with an Antalgic Gait with decreased weight bearing tolerance onto right LE. Pt presents with decreased arch height and pes planus in weightbearing. Ankle strength is grossly 4 to 4-/5. TTP to Distal Achilles and inferior portion of Malleolus. Right Ankle ROM DF=90 deg/Neutral, PF=50 deg  Pain =5/10. Pt wants to be able to navigate her community and return to the [facility]. Pt will benefit from skilled Therapy to improve ROM, decrease pain and address functional limitations and return to [facility].     Evaluation Complexity:  History:  LOW Complexity : Zero comorbidities / personal factors that will impact the outcome / POC; Examination:  LOW Complexity : 1-2 Standardized tests and measures addressing body structure, function, activity limitation and / or participation in recreation  ;Presentation:  LOW Complexity : Stable, uncomplicated  ;Clinical Decision Making:  MEDIUM Complexity : FOTO score of 26-74 FOTO score = an established functional score where 100 = no disability  Overall Complexity Rating: LOW   Problem List: pain affecting function, decrease ROM, decrease strength, impaired gait/balance, decrease ADL/functional abilities, decrease activity tolerance, and decrease flexibility/joint mobility   Treatment Plan may include any combination of the followin Therapeutic Exercise, 14341 Neuromuscular Re-Education, 16826 Manual Therapy, 03272 Therapeutic Activity, 05710 Self Care/Home Management, 31197 Electrical Stim unattended, 22945 Electrical Stim attended, 66720 Vasopneumatic Device, and 31932 Ultrasound  Patient / Family readiness to learn indicated by: asking questions, trying to perform skills, interest, return verbalization , and return demonstration   Persons(s) to be included in education: patient (P)  Barriers to Learning/Limitations: none  Measures taken if barriers to learning present: none  Patient Goal (s): Mobility improvements and no pain or throbbing. Patient Self Reported Health Status: good  Rehabilitation Potential: good    Short Term Goals: To be accomplished in 1 weeks  Goal:Pt will establish a HEP to improve function  Status at evaluation/last progress note: Issued HEP. Long Term Goals: To be accomplished in 4     Goal:Pt will increase FOTO score by 10  pt to improve function  Status at evaluation/last progress note:FOTO=60     2.   Goal:Pt will increase right Ankle DF to 10 deg above neutral to ease with Gait mechanics during ambulation  Status at evaluation/last progress note:Right Ankle DF =90 deg/neutral     3. Goal:Pt will report pain <3/10 to ease with activity tolerance. Status at evaluation/last progress note:Pain =5/10 at eval .     4.   Goal:Pt will have no difficulty performing light activities around her home. Status at evaluation/last progress note:Pt has little difficulty performing light activities.     Frequency / Duration: Patient to be seen 2-3 times per week for 4 weeks    Patient/ Caregiver education and instruction: Diagnosis, prognosis, self care, activity modification, and exercises [x]  Plan of care has been reviewed with HEENA Nolasco, PT       2/14/2023       4:24 PM  ===================================================================  I certify that the above Therapy Services are being furnished while the patient is under my care. I agree with the treatment plan and certify that this therapy is necessary. [de-identified] Signature:_________________________   DATE:_________   TIME:________                           Ozell Due*    ** Signature, Date and Time must be completed for valid certification **  [facility] or you may fax the signed copy to [phone]. Thank you.

## 2023-02-15 NOTE — PROGRESS NOTES
PHYSICAL / OCCUPATIONAL THERAPY - DAILY TREATMENT NOTE (updated )    Patient Name: Chandana Rouse    Date: 2023    : 1955  Insurance: Payor: Brian Dun[employer] 150 / Plan: Media Kyle / Product Type: *No Product type* /      Patient  verified yes     Visit #   Current / Total 1 10-12   Time   In / Out 335 430   Pain   In / Out 5 4   Subjective Functional Status/Changes: DX with bone spur Months ago and getting worst.    Changes to:  Meds, Allergies, Med Hx, Sx Hx? If yes, update Summary List no       TREATMENT AREA =  Right ankle pain [M25.571]    OBJECTIVE    Modalities Rationale:     decrease edema and decrease pain to improve patient's ability to progress to PLOF and address remaining functional goals. min [] Estim Unattended, type/location:                                      []  w/ice    []  w/heat    min [] Estim Attended, type/location:                                     []  w/US     []  w/ice    []  w/heat    []  TENS insruct      min []  Mechanical Traction: type/lbs                   []  pro   []  sup   []  int   []  cont    []  before manual    []  after manual    min []  Ultrasound, settings/location:      min []  Iontophoresis w/ dexamethasone, location:                                               []  take home patch       []  in clinic   10 min  unbilled []  Ice     [x]  Heat    location/position: Supine/ right ankle    min []  Paraffin,  details:     min []  Vasopneumatic Device, press/temp:     min []  Sandi Mercedes / Radha Minor: If using vaso (only need to measure limb vaso being performed on)      pre-treatment girth :       post-treatment girth :       measured at (landmark location) :      min []  Other:    Skin assessment post-treatment (if applicable):    []  intact    []  redness- no adverse reaction                 []redness - adverse reaction:           20 min []Eval                  []Re-Eval     Therapeutic Procedures:   Tx Min Billable or 1:1 Min (if diff from [employer]) Procedure, Rationale, Specifics   17 1 60310 Therapeutic Exercise (timed):  increase ROM, strength, coordination, balance, and proprioception to improve patient's ability to progress to PLOF and address remaining functional goals. (see flow sheet as applicable)     Details if applicable:     8 1 80963 Manual Therapy (timed):  decrease pain, increase tissue extensibility, and decrease trigger points to improve patient's ability to progress to PLOF and address remaining functional goals. The manual therapy interventions were performed at a separate and distinct time from the therapeutic activities interventions .  (see flow sheet as applicable)     Details if applicable:       [facility] BC Totals Reminder: bill using total billable min of TIMED therapeutic procedures (example: do not include dry needle or estim unattended, both untimed codes, in totals to left)  8-22 min = 1 unit; 23-37 min = 2 units; 38-52 min = 3 units; 53-67 min = 4 units; 68-82 min = 5 units   Total Total     [x]  Patient Education billed concurrently with other procedures   [x] Review HEP    [] Progressed/Changed HEP, detail:    [] Other detail:       Objective Information/Functional Measures/Assessment    SUBJECTIVE  Pain Level (0-10 scale): 5  []constant [x]intermittent []improving []worsening []no change since onset    Any medication changes, allergies to medications, adverse drug reactions, diagnosis change, or new procedure performed?: [x] No    [] Yes (see summary sheet for update)  Subjective functional status/changes:     PLOF: indep endent amb  Limitations to PLOF: decreased waking tolerance  Mechanism of Injury: none/spur  Current symptoms/Complaints: pain  Previous Treatment/Compliance: none  PMHx/Surgical Hx: na  Work Hx: retired  Living Situation: alone  Pt Goals: decrease pain  Barriers: []pain []financial []time []transportation []other  Motivation: yes  Substance use: []Alcohol []Tobacco []other:   FABQ Score: []low []elevate  Cognition: A & O x 4    Other:    OBJECTIVE/EXAMINATION  Domestic Life: lives alone  Activity/Recreational Limitations: walking  Mobility: indep  Self Care: indep    Physical Therapy Evaluation  - Foot and Ankle    Gait: [] Normal    [] Abnormal    [x] Antalgic    [] NWB    Device:  Describe:    ROM/Strength  [] Unable to assess at this time      AROM        PROM            Strength (1-5)   Left Right Left Right Left  Right   Dorsiflexion 10 deg above Neutral 90 deg    4   Plantarflexion 50 deg 40 deg    4-   Inversion      4-   Eversion      4-   Great Toe Ext         Great Toe Flex           Flexibility: [] Unable to assess at this time  Gastroc:    (L) Tightness [] WNL   [] Min   [] Mod   [] Severe    (R) Tightness [] WNL   [] Min   [x] Mod   [] Severe  Soleus:    (L) Tightness [] WNL   [] Min   [] Mod   [] Severe    (R) Tightness [] WNL   [] Min   [x] Mod   [] Severe  Other:      (L) Tightness [] WNL   [] Min   [] Mod   [] Severe    (R) Tightness [] WNL   [] Min   [] Mod   [] Severe    Palpation:   Location: distal achilles  Patient's Pain Response: [] Min   [x] Mod   [] Severe  Location:dorsum of right foot  Patient's Pain Response: [] Min   [x] Mod   [] Severe    Optional Tests:  Sub-talor alignment: [] Neutral     [x] Pronation      [] Supination    Forefoot alignment:  [] Neutral     [] Varus            [] Valgus        Anterior Drawer: [x] Neg    [] Pos  Posterior Drawer:  [] Neg    [] Pos  Inversion Stress:  [] Neg    [] Pos  Talar Tilt:   [] Neg    [] Pos  Eversion Stress:  [] Neg    [] Pos  Chandni's Sign:  [] Neg    [] Pos  Marin Test: [] Neg    [] Pos    Other tests/ comments:            Patient will continue to benefit from skilled PT / OT services to modify and progress therapeutic interventions, analyze and address functional mobility deficits, analyze and address ROM deficits, analyze and address strength deficits, analyze and address soft tissue restrictions, analyze and cue for proper movement patterns, analyze and modify for postural abnormalities, analyze and address imbalance/dizziness, and instruct in home and community integration to address functional deficits and attain remaining goals.     Progress toward goals / Updated goals:  []  See Progress Note/Recertification    See poc    PLAN  yes Continue plan of care  []  Upgrade activities as tolerated  []  Discharge due to :  []  Other:    Boston Guthrie, PT    2/14/2023    8:15 PM    Future Appointments   Date Time Provider Gumaro Pelletier   2/21/2023  2:00 PM Rigo Rouse PTA [id number] [facility]   2/22/2023  2:00 PM Rigo Rouse PTA [id number] [facility]   2/28/2023  4:00 PM Boston Guthrie, PT [id number] [facility]   3/2/2023 10:00 AM Rigo Rouse PTA [id number] [facility]   3/7/2023  1:30 PM Boston Guthrie, PT [id number] [facility]   3/9/2023 10:00 AM Rigo Rouse PTA [id number] [facility]

## 2023-02-21 ENCOUNTER — HOSPITAL ENCOUNTER (OUTPATIENT)
Facility: HOSPITAL | Age: 68
Setting detail: RECURRING SERIES
Discharge: HOME | End: 2023-02-24
Payer: COMMERCIAL

## 2023-02-21 PROCEDURE — 97140 MANUAL THERAPY 1/> REGIONS: CPT

## 2023-02-21 PROCEDURE — 97110 THERAPEUTIC EXERCISES: CPT

## 2023-02-21 NOTE — PROGRESS NOTES
PHYSICAL / OCCUPATIONAL THERAPY - DAILY TREATMENT NOTE (updated )    Patient Name: Latoya Calvillo    Date: 2023    : 1955  Insurance: Payor: Ej Bass / Plan: Akosua Sanchez / Product Type: *No Product type* /      Patient  verified Yes     Visit #   Current / Total 2 12   Time   In / Out 2:03 2:45   Pain   In / Out 4/10 4/10   Subjective Functional Status/Changes: Pt reports her right hip bothers her more than the back of the ankle. She is trying to avoid a shot in her hip but hasn't been diligent about icing to reduce inflammation. She feels weak in her hips if she walks prolonged periods but used to be able to walk 4 miles a day. She has recently stopped the treadmill and bike at the gym to reduce her right hip pain. She is unable to currently sleep on her right side. Changes to:  Meds, Allergies, Med Hx, Sx Hx? If yes, update Summary List no       TREATMENT AREA =  Right ankle pain [M25.571]    OBJECTIVE    Therapeutic Procedures: Tx Min Billable or 1:1 Min (if diff from Tx Min) Procedure, Rationale, Specifics   27  [id number] Therapeutic Exercise (timed):  increase ROM, strength, coordination, balance, and proprioception to improve patient's ability to progress to PLOF and address remaining functional goals. (see flow sheet as applicable)     Details if applicable:  pt education noted below     [phone] [id number] Manual Therapy (timed):  decrease pain, increase ROM, and increase tissue extensibility to improve patient's ability to progress to PLOF and address remaining functional goals. The manual therapy interventions were performed at a separate and distinct time from the therapeutic activities interventions .  (see flow sheet as applicable)     Details if applicable:  leg lengthening for left upslip; MET for right AI; shotgun technique; MET for left l/s rotation    Stick massage education and had pt perform to right calf and lateral ITB          Details if applicable:            Details if applicable:            Details if applicable:       [facility] Totals Reminder: bill using total billable min of TIMED therapeutic procedures (example: do not include dry needle or estim unattended, both untimed codes, in totals to left)  8-22 min = 1 unit; 23-37 min = 2 units; 38-52 min = 3 units; 53-67 min = 4 units; 68-82 min = 5 units   Total Total     [x]  Patient Education billed concurrently with other procedures   [x] Review HEP    [] Progressed/Changed HEP, detail:    [] Other detail:       Objective Information/Functional Measures/Assessment  TTP right greater trochanter  Educated on stick massage to right calf and right ITB  Educated on importance of ice to right greater trochanter  Lateral calf hypertonic on right and holds right LE in slight ER  More arch collapse noted on the left during gait    Initiated exercise program per POC. Pt with right LE held in slight ER contributing to lateral calf tightness compared to medial. She has more left arch collapse during ambulation and pelvic obliquity. She has decreased glute strength needed for stability which along with altered gait due to ankle pain could be contributing to bursitis symptoms. Will work to improve strength and calf flexibility for decreased pain with ambulation and return to gym activities. Patient will continue to benefit from skilled PT / OT services to modify and progress therapeutic interventions, analyze and address functional mobility deficits, analyze and address ROM deficits, analyze and address strength deficits, analyze and address soft tissue restrictions, analyze and cue for proper movement patterns, analyze and modify for postural abnormalities, analyze and address imbalance/dizziness, and instruct in home and community integration to address functional deficits and attain remaining goals. Progress toward goals / Updated goals:  [x]  See Progress Note/Recertification  Short Term Goals:  To be accomplished in 1 weeks  Goal:Pt will establish a HEP to improve function  Status at evaluation/last progress note: Issued HEP. MET  Long Term Goals: To be accomplished in 4 weeks   Goal:Pt will increase FOTO score by 10  pt to improve function  Status at evaluation/last progress note:FOTO=60   2.   Goal:Pt will increase right Ankle DF to 10 deg above neutral to ease with Gait mechanics during ambulation  Status at evaluation/last progress note:Right Ankle DF =90 deg/neutral   3. Goal:Pt will report pain <3/10 to ease with activity tolerance. Status at evaluation/last progress note:Pain =5/10 at eval .   4.   Goal:Pt will have no difficulty performing light activities around her home. Status at evaluation/last progress note:Pt has little difficulty performing light activities.     PLAN  Yes  Continue plan of care  []  Upgrade activities as tolerated  []  Discharge due to :  []  Other:    Damon Maxwell PTA    2/21/2023    12:36 PM    Future Appointments   Date Time Provider Gumaro Pelletier   2/21/2023  2:00 PM Kaitlin Frias [id number] [facility]   2/22/2023  2:00 PM Damon Maxwell PTA [id number] [facility]   2/28/2023  4:00 PM Xiao Clifton, PT [id number] [facility]   3/2/2023 10:00 AM Damon Maxwell PTA [id number] [facility]   3/7/2023  1:30 PM Xiao Clifton, PT [email or web address] [facility]   3/9/2023 10:00 AM Damon Maxwell PTA [email or web address] [facility]

## 2023-02-22 ENCOUNTER — HOSPITAL ENCOUNTER (OUTPATIENT)
Facility: HOSPITAL | Age: 68
Setting detail: RECURRING SERIES
Discharge: HOME | End: 2023-02-25
Payer: COMMERCIAL

## 2023-02-22 PROCEDURE — 97140 MANUAL THERAPY 1/> REGIONS: CPT

## 2023-02-22 PROCEDURE — 97110 THERAPEUTIC EXERCISES: CPT

## 2023-02-22 NOTE — PROGRESS NOTES
PHYSICAL / OCCUPATIONAL THERAPY - DAILY TREATMENT NOTE (updated )    Patient Name: Bisi Randolph    Date: 2023    : 1955  Insurance: Payor: Mauricio Rock / Plan: Lulu Romero / Product Type: *No Product type* /      Patient  verified Yes     Visit #   Current / Total 3 12   Time   In / Out 2:00 2:41   Pain   In / Out 4/10 4/10   Subjective Functional Status/Changes: Pt reports her arms are sore from working out at the gym this morning. She states her hip is feeling better today and it is more the heel spur on the back of the ankle that is sore today. She has been working on her stretches, using ice on her hip and ordered her stick massager. Changes to:  Meds, Allergies, Med Hx, Sx Hx? If yes, update Summary List no       TREATMENT AREA =  Right ankle pain [M25.571]    OBJECTIVE    Therapeutic Procedures: Tx Min Billable or 1:1 Min (if diff from Tx Min) Procedure, Rationale, Specifics   31  [id number] Therapeutic Exercise (timed):  increase ROM, strength, coordination, balance, and proprioception to improve patient's ability to progress to PLOF and address remaining functional goals. (see flow sheet as applicable)     Details if applicable:  pt education noted below     10  [id number] Manual Therapy (timed):  decrease pain, increase ROM, and increase tissue extensibility to improve patient's ability to progress to PLOF and address remaining functional goals. The manual therapy interventions were performed at a separate and distinct time from the therapeutic activities interventions .  (see flow sheet as applicable)     Details if applicable:  TPR tibialis posterior, lateral soleus and lateral head of the gastroc, STM right calf  No upslip or rotation of pelvis today     [facility] Totals Reminder: bill using total billable min of TIMED therapeutic procedures (example: do not include dry needle or estim unattended, both untimed codes, in totals to left)  8-22 min = 1 unit; 23-37 min = 2 units; 38-52 min = 3 units; 53-67 min = 4 units; 68-82 min = 5 units   Total Total     [x]  Patient Education billed concurrently with other procedures   [x] Review HEP    [] Progressed/Changed HEP, detail:    [] Other detail:       Objective Information/Functional Measures/Assessment  B pes planus; educated on spenco or superfeet orthotics  TTP lateral gastroc head and tibialis posterior  Hamstring cramping with attempt at prone donkey kicks  Anterior thigh pull with attempt at bridge even when cued for more glute activation  Initiated supine PPT with glute squeeze and pt able to perform without cramping  In side lying cues to keep hip depressed to reduce QL compensation for hip abduction  Added janet stretch for hip flexor tightness    Pt progressing with therapy recommendations of ice use to the right lateral hip and getting a stick massager to aide in reducing hypertonic gastro muscle. She has hip flexor tightness and glute weakness in conjunction with pes planus contributing to likely bursitis symptoms right>left hip. She needs to progress glute strength, arch strength and consistently wear orthotics to aide in more neutral foot to lessen achilles pull and pain to posterior heel at region of bone spur. Patient will continue to benefit from skilled PT / OT services to modify and progress therapeutic interventions, analyze and address functional mobility deficits, analyze and address ROM deficits, analyze and address strength deficits, analyze and address soft tissue restrictions, analyze and cue for proper movement patterns, analyze and modify for postural abnormalities, analyze and address imbalance/dizziness, and instruct in home and community integration to address functional deficits and attain remaining goals. Progress toward goals / Updated goals:  [x]  See Progress Note/Recertification  Short Term Goals:  To be accomplished in 1 weeks  Goal:Pt will establish a HEP to improve function  Status at evaluation/last progress note: Issued HEP. MET  Long Term Goals: To be accomplished in 4 weeks   Goal:Pt will increase FOTO score by 10  pt to improve function  Status at evaluation/last progress note:FOTO=60   2.   Goal:Pt will increase right Ankle DF to 10 deg above neutral to ease with Gait mechanics during ambulation  Status at evaluation/last progress note:Right Ankle DF =90 deg/neutral   3. Goal:Pt will report pain <3/10 to ease with activity tolerance. Status at evaluation/last progress note:Pain =5/10 at eval .   4.   Goal:Pt will have no difficulty performing light activities around her home. Status at evaluation/last progress note:Pt has little difficulty performing light activities.     PLAN  Yes  Continue plan of care  []  Upgrade activities as tolerated  []  Discharge due to :  []  Other:    Merly Sim PTA    2/22/2023    12:30 PM    Future Appointments   Date Time Provider Gumaro Pelletier   2/22/2023  2:00 PM Kaitlin Guadalupe [id number]   2/28/2023  4:00 PM Ananya Oswald, PT [id number] [facility]   3/2/2023 10:00 AM Merly Sim PTA [id number] [facility]   3/7/2023  1:30 PM Ananya Oswald, PT [id number] [facility]   3/9/2023 10:00 AM Merly Sim PTA [email or web address] [facility]

## 2023-02-28 ENCOUNTER — HOSPITAL ENCOUNTER (OUTPATIENT)
Facility: HOSPITAL | Age: 68
Setting detail: RECURRING SERIES
Discharge: HOME | End: 2023-03-03
Payer: COMMERCIAL

## 2023-02-28 PROCEDURE — 97035 APP MDLTY 1+ULTRASOUND EA 15: CPT

## 2023-02-28 PROCEDURE — 97110 THERAPEUTIC EXERCISES: CPT

## 2023-02-28 NOTE — PROGRESS NOTES
PHYSICAL / OCCUPATIONAL THERAPY - DAILY TREATMENT NOTE (updated )    Patient Name: Nacho Remedies    Date: 2023    : 1955  Insurance: Payor: Mary Pod / Plan: Fantasma Wilson / Product Type: *No Product type* /      Patient  verified Yes     Visit #   Current / Total 4 8-10   Time   In / Out [phone]   Pain   In / Out 4 4   Subjective Functional Status/Changes: Reports she got a massage stick recently. She has been doing HEP and rolling. Changes to:  Meds, Allergies, Med Hx, Sx Hx? If yes, update Summary List no       TREATMENT AREA =  Right ankle pain [M25.571]    OBJECTIVE    Modalities Rationale:     decrease inflammation, decrease pain, and increase tissue extensibility to improve patient's ability to progress to PLOF and address remaining functional goals. min [] Estim Unattended, type/location:                                      []  w/ice    []  w/heat    min [] Estim Attended, type/location:                                     []  w/US     []  w/ice    []  w/heat    []  TENS insruct      min []  Mechanical Traction: type/lbs                   []  pro   []  sup   []  int   []  cont    []  before manual    []  after manual   8 min [x]  Ultrasound, settings/location:  Low frequency, 1.5 w/cm2, 50%. Posterior heel/distal achilles and at achilles insertion. min []  Iontophoresis w/ dexamethasone, location:                                               []  take home patch       []  in clinic    min  unbill []  Ice     []  Heat    location/position:     min []  Paraffin,  details:     min []  Vasopneumatic Device, press/temp:     min []  Keri Muckle / Zahira Pack:     If using vaso (only need to measure limb vaso being performed on)      pre-treatment girth :       post-treatment girth :       measured at (landmark location) :      min []  Other:    Skin assessment post-treatment (if applicable):    []  intact    []  redness- no adverse reaction                 []redness - adverse reaction:         Therapeutic Procedures: Tx Min Billable or 1:1 Min (if diff from Tx Min) Procedure, Rationale, Specifics   24  [id number] Therapeutic Exercise (timed):  increase ROM, strength, coordination, balance, and proprioception to improve patient's ability to progress to PLOF and address remaining functional goals. (see flow sheet as applicable)     Details if applicable:         [facility] Totals Reminder: bill using total billable min of TIMED therapeutic procedures (example: do not include dry needle or estim unattended, both untimed codes, in totals to left)  8-22 min = 1 unit; 23-37 min = 2 units; 38-52 min = 3 units; 53-67 min = 4 units; 68-82 min = 5 units   Total Total     [x]  Patient Education billed concurrently with other procedures   [x] Review HEP    [] Progressed/Changed HEP, detail:    [] Other detail:       Objective Information/Functional Measures/Assessment    Trial of Low Frequency U.S to decrease pain and inflammation, see above.  -added Ankle strengthening ex's as well as stability ex's with vision decreased. Pt agreed to trial of US indicated for pain reduction and inflammation. She has demonstrated understanding of ankle theraband ex's as she continues to work on hip alignment and glute/hip strengthening ex's. Patient will continue to benefit from skilled PT / OT services to modify and progress therapeutic interventions, analyze and address functional mobility deficits, analyze and address ROM deficits, analyze and address strength deficits, analyze and address soft tissue restrictions, analyze and cue for proper movement patterns, analyze and modify for postural abnormalities, and analyze and address imbalance/dizziness to address functional deficits and attain remaining goals. Progress toward goals / Updated goals:  []  See Progress Note/Recertification    Short Term Goals:  To be accomplished in 1 weeks  Goal:Pt will establish a HEP to improve function  Status at evaluation/last progress note: Issued HEP. MET  Long Term Goals: To be accomplished in 4 weeks   Goal:Pt will increase FOTO score by 10  pt to improve function  Status at evaluation/last progress note:FOTO=60   2.   Goal:Pt will increase right Ankle DF to 10 deg above neutral to ease with Gait mechanics during ambulation  Status at evaluation/last progress note:Right Ankle DF =90 deg/neutral   3. Goal:Pt will report pain <3/10 to ease with activity tolerance. Status at evaluation/last progress note:Pain =5/10 at eval .   4.   Goal:Pt will have no difficulty performing light activities around her home. Status at evaluation/last progress note:Pt has little difficulty performing light activities.        PLAN  Yes  Continue plan of care  []  Upgrade activities as tolerated  []  Discharge due to :  []  Other:    Alea Son, PT    2/28/2023    5:34 PM    Future Appointments   Date Time Provider Gumaro Pelletier   3/2/2023 10:00 AM Miguel Gillis PTA [id number] [facility]   3/7/2023  1:30 PM Alea Son, PT [email or web address] [facility]   3/9/2023 10:00 AM Miguel Gillis PTA [id number] [facility]

## 2023-03-02 ENCOUNTER — HOSPITAL ENCOUNTER (OUTPATIENT)
Facility: HOSPITAL | Age: 68
Setting detail: RECURRING SERIES
Discharge: HOME | End: 2023-03-05
Payer: COMMERCIAL

## 2023-03-02 PROCEDURE — 97110 THERAPEUTIC EXERCISES: CPT

## 2023-03-02 NOTE — PROGRESS NOTES
PHYSICAL / OCCUPATIONAL THERAPY - DAILY TREATMENT NOTE (updated )    Patient Name: Merline Muff    Date: 3/2/2023    : 1955  Insurance: Payor: [facility] / Plan: Pito Vasquez / Product Type: *No Product type* /      Patient  verified Yes     Visit #   Current / Total 5 8-10   Time   In / Out 10:00 10:38   Pain   In / Out 0/10 0/10   Subjective Functional Status/Changes: Pt reports overall feeling improvement and better understanding since starting therapy. She feels she will be ready to D/C in 2 visits. She is using the  at home and is going to see ortho to get a shot in her hip to calm down the inflammation. She would like updated pictures to work on but feels she can progress at home. Changes to:  Meds, Allergies, Med Hx, Sx Hx? If yes, update Summary List no       TREATMENT AREA =  Right ankle pain [M25.571]    OBJECTIVE  Therapeutic Procedures: Tx Min Billable or 1:1 Min (if diff from Tx Min) Procedure, Rationale, Specifics   38 [phone] [id number] Therapeutic Exercise (timed):  increase ROM, strength, coordination, balance, and proprioception to improve patient's ability to progress to PLOF and address remaining functional goals.  (see flow sheet as applicable)     Details if applicable:         [facility] Totals Reminder: bill using total billable min of TIMED therapeutic procedures (example: do not include dry needle or estim unattended, both untimed codes, in totals to left)  8-22 min = 1 unit; 23-37 min = 2 units; 38-52 min = 3 units; 53-67 min = 4 units; 68-82 min = 5 units   Total Total     [x]  Patient Education billed concurrently with other procedures   [x] Review HEP    [] Progressed/Changed HEP, detail:    [] Other detail:       Objective Information/Functional Measures/Assessment  Educated on focus of eccentric portion of right ankle PF for lengthening of the gastroc muscle  Reviewed using BTB (provided for home) of ankle x 4 exercise  Educated on correct form with hip abduction to avoid TFL compensation  Educated on importance of hip strengthening to reduce bursitis symptoms and use of orthotics for foot alignment    Pt progressing well in therapy with understanding of how the foot and hip correlate in regards to altered mechanics that can increase pain symptoms. She will be ready for D/C with an updated HEP in 2 visits. She needs to review eccentric gastroc/soleus strengthening for lengthening to reduce tension of the achilles during the push off phase of gait. Patient will continue to benefit from skilled PT / OT services to modify and progress therapeutic interventions, analyze and address functional mobility deficits, analyze and address ROM deficits, analyze and address strength deficits, analyze and address soft tissue restrictions, analyze and cue for proper movement patterns, analyze and modify for postural abnormalities, and analyze and address imbalance/dizziness to address functional deficits and attain remaining goals. Progress toward goals / Updated goals:  [x]  See Progress Note/Recertification  Short Term Goals: To be accomplished in 1 weeks  Goal:Pt will establish a HEP to improve function  Status at evaluation/last progress note: Issued HEP. MET  Long Term Goals: To be accomplished in 4 weeks   Goal:Pt will increase FOTO score by 10  pt to improve function  Status at evaluation/last progress note:FOTO=60   2.   Goal:Pt will increase right Ankle DF to 10 deg above neutral to ease with Gait mechanics during ambulation  Status at evaluation/last progress note:Right Ankle DF =90 deg/neutral   3. Goal:Pt will report pain <3/10 to ease with activity tolerance. Status at evaluation/last progress note:Pain =5/10 at eval .   4.   Goal:Pt will have no difficulty performing light activities around her home. Status at evaluation/last progress note:Pt has little difficulty performing light activities.      PLAN  Yes  Continue plan of care  []  Upgrade activities as tolerated  []  Discharge due to :  []  Other:    Hui Singleton PTA    3/2/2023    8:23 AM    Future Appointments   Date Time Provider Gumaro Pelletier   3/2/2023 10:00 AM Hui Singleton PTA [id number] [facility]   3/7/2023  1:30 PM Malou Dumont, PT [id number] [facility]   3/9/2023 10:00 AM Hui Singleton PTA [id number] [facility]

## 2023-03-07 ENCOUNTER — HOSPITAL ENCOUNTER (OUTPATIENT)
Facility: HOSPITAL | Age: 68
Setting detail: RECURRING SERIES
Discharge: HOME | End: 2023-03-10
Payer: COMMERCIAL

## 2023-03-07 PROCEDURE — 97110 THERAPEUTIC EXERCISES: CPT

## 2023-03-07 PROCEDURE — 97140 MANUAL THERAPY 1/> REGIONS: CPT

## 2023-03-07 NOTE — PROGRESS NOTES
PHYSICAL / OCCUPATIONAL THERAPY - DAILY TREATMENT NOTE (updated )    Patient Name: Zachary Hernández    Date: 3/7/2023    : 1955  Insurance: Payor: Yaya Core / Plan: Concetta Stains / Product Type: *No Product type* /      Patient  verified Yes     Visit #   Current / Total 6 7   Time   In / Out 131 206   Pain   In / Out 2 2   Subjective Functional Status/Changes: Reports she will finish up with PT next visit. She wendy get a hot from the doctor for her right hip. Changes to:  Meds, Allergies, Med Hx, Sx Hx? If yes, update Summary List no       TREATMENT AREA =  Right ankle pain [M25.571]    OBJECTIVE         Therapeutic Procedures: Tx Min Billable or 1:1 Min (if diff from Tx Min) Procedure, Rationale, Specifics   25  89417 Therapeutic Exercise (timed):  increase ROM, strength, coordination, balance, and proprioception to improve patient's ability to progress to PLOF and address remaining functional goals. (see flow sheet as applicable)     Details if applicable:       10  60544 Manual Therapy (timed):  decrease pain, increase ROM, increase tissue extensibility, and decrease trigger points to improve patient's ability to progress to PLOF and address remaining functional goals. The manual therapy interventions were performed at a separate and distinct time from the therapeutic activities interventions . (see flow sheet as applicable)     Details if applicable:  prone TPR to gastroc/ passive DF in prone , leg extended and knee flexed.       28  [address] [place] [address] Way Reminder: bill using total billable min of TIMED therapeutic procedures (example: do not include dry needle or estim unattended, both untimed codes, in totals to left)  8-22 min = 1 unit; 23-37 min = 2 units; 38-52 min = 3 units; 53-67 min = 4 units; 68-82 min = 5 units   Total Total     [x]  Patient Education billed concurrently with other procedures   [x] Review HEP    [] Progressed/Changed HEP, detail:    [] Other detail:       Objective Information/Functional Measures/Assessment    -continued posterior ankle pain with TTP.   -she has not started wearing the heel insert. -pt wearing good/supportive shoes.   -issued HEP. Pt has made minimal progress. She is pleased with pt education and will continue with HEP. She will finish up next visit. Patient will continue to benefit from skilled PT / OT services to modify and progress therapeutic interventions, analyze and address functional mobility deficits, analyze and address ROM deficits, analyze and address strength deficits, analyze and address soft tissue restrictions, analyze and cue for proper movement patterns, analyze and modify for postural abnormalities, and analyze and address imbalance/dizziness to address functional deficits and attain remaining goals. Progress toward goals / Updated goals:  []  See Progress Note/Recertification      Short Term Goals: To be accomplished in 1 weeks  Goal:Pt will establish a HEP to improve function  Status at evaluation/last progress note: Issued HEP. MET  Long Term Goals: To be accomplished in 4 weeks   Goal:Pt will increase FOTO score by 10  pt to improve function  Status at evaluation/last progress note:FOTO=60     2.   Goal:Pt will increase right Ankle DF to 10 deg above neutral to ease with Gait mechanics during ambulation  Status at evaluation/last progress note:Right Ankle DF =90 deg/neutral     3. Goal:Pt will report pain <3/10 to ease with activity tolerance. Status at evaluation/last progress note:Pain =5/10 at eval .   Current=met. 4.   Goal:Pt will have no difficulty performing light activities around her home. Status at evaluation/last progress note:Pt has little difficulty performing light activities.    PLAN  Yes  Continue plan of care  []  Upgrade activities as tolerated  []  Discharge due to :  []  Other:    Haily Butt PT    3/7/2023    2:58 PM    Future Appointments   Date Time Provider Gumaro Pelletier   3/9/2023 10:00 AM Nghia Robert, PTA [id number]

## 2023-03-09 ENCOUNTER — HOSPITAL ENCOUNTER (OUTPATIENT)
Facility: HOSPITAL | Age: 68
Setting detail: RECURRING SERIES
Discharge: HOME | End: 2023-03-12
Payer: COMMERCIAL

## 2023-03-09 PROCEDURE — 97110 THERAPEUTIC EXERCISES: CPT

## 2023-03-09 NOTE — PROGRESS NOTES
PHYSICAL / OCCUPATIONAL THERAPY - DAILY TREATMENT NOTE (updated )    Patient Name: Ant Close    Date: 3/9/2023    : 1955  Insurance: Payor: Erasto Ruiz / Plan: Rodrigo Richmond / Product Type: *No Product type* /      Patient  verified Yes     Visit #   Current / Total 7 7   Time   In / Out 1000 1038   Pain   In / Out 2 2   Subjective Functional Status/Changes: Reports she has made good improvement is Therapy. She has learnt stretches and ex's to improve functional mobility. Changes to:  Meds, Allergies, Med Hx, Sx Hx? If yes, update Summary List no       TREATMENT AREA =  Right ankle pain [M25.571]    OBJECTIVE         Therapeutic Procedures: Tx Min Billable or 1:1 Min (if diff from Tx Min) Procedure, Rationale, Specifics   38     09110 Therapeutic Exercise (timed):  increase ROM, strength, coordination, balance, and proprioception to improve patient's ability to progress to PLOF and address remaining functional goals. (see flow sheet as applicable)      Details if applicable:              Details if applicable:     45  [facility] Totals Reminder: bill using total billable min of TIMED therapeutic procedures (example: do not include dry needle or estim unattended, both untimed codes, in totals to left)  8-22 min = 1 unit; 23-37 min = 2 units; 38-52 min = 3 units; 53-67 min = 4 units; 68-82 min = 5 units   Total Total     [x]  Patient Education billed concurrently with other procedures   [x] Review HEP    [] Progressed/Changed HEP, detail:    [] Other detail:       Objective Information/Functional Measures/Assessment    FOTO= 66  DF=6 deg above 90 deg/neutral.    Pt DC's with goals Met/Progressing with decreased pain and improved function.      Patient will continue to benefit from skilled PT / OT services to modify and progress therapeutic interventions, analyze and address functional mobility deficits, analyze and address ROM deficits, analyze and address strength deficits, analyze and address soft tissue restrictions, analyze and cue for proper movement patterns, analyze and modify for postural abnormalities, analyze and address imbalance/dizziness, and instruct in home and community integration to address functional deficits and attain remaining goals. Progress toward goals / Updated goals:  []  See Progress Note/Recertification    function  Status at evaluation/last progress note: Issued HEP. MET  Long Term Goals: To be accomplished in 4 weeks   Goal:Pt will increase FOTO score by 10  pt to improve function  Status at evaluation/last progress note:FOTO=60      2.   Goal:Pt will increase right Ankle DF to 10 deg above neutral to ease with Gait mechanics during ambulation  Status at evaluation/last progress note:Right Ankle DF =90 deg/neutral      3. Goal:Pt will report pain <3/10 to ease with activity tolerance. Status at evaluation/last progress note:Pain =5/10 at eval .   Current=met. 4.   Goal:Pt will have no difficulty performing light activities around her home. Status at evaluation/last progress note:Pt has little difficulty performing light activities. Current MET     PLAN  Yes  Continue plan of care  []  Upgrade activities as tolerated  []  Discharge due to :  []  Other:    Rosy Owen PT    3/9/2023    10:12 AM    No future appointments.

## 2023-11-13 ENCOUNTER — TRANSCRIBE ORDERS (OUTPATIENT)
Facility: HOSPITAL | Age: 68
End: 2023-11-13

## 2023-11-13 DIAGNOSIS — Z12.31 VISIT FOR SCREENING MAMMOGRAM: Primary | ICD-10-CM

## 2023-12-05 ENCOUNTER — HOSPITAL ENCOUNTER (OUTPATIENT)
Facility: HOSPITAL | Age: 68
Discharge: HOME | End: 2023-12-08
Payer: COMMERCIAL

## 2023-12-05 DIAGNOSIS — Z12.31 VISIT FOR SCREENING MAMMOGRAM: ICD-10-CM

## 2023-12-05 PROCEDURE — 77063 BREAST TOMOSYNTHESIS BI: CPT

## 2024-11-18 ENCOUNTER — TRANSCRIBE ORDERS (OUTPATIENT)
Facility: HOSPITAL | Age: 69
End: 2024-11-18

## 2024-11-18 DIAGNOSIS — Z12.31 VISIT FOR SCREENING MAMMOGRAM: Primary | ICD-10-CM

## 2024-12-09 ENCOUNTER — HOSPITAL ENCOUNTER (OUTPATIENT)
Facility: HOSPITAL | Age: 69
Discharge: HOME | End: 2024-12-12
Payer: COMMERCIAL

## 2024-12-09 VITALS — WEIGHT: 218 LBS | BODY MASS INDEX: 34.21 KG/M2 | HEIGHT: 67 IN

## 2024-12-09 DIAGNOSIS — Z12.31 VISIT FOR SCREENING MAMMOGRAM: ICD-10-CM

## 2024-12-09 PROCEDURE — 77063 BREAST TOMOSYNTHESIS BI: CPT
